# Patient Record
Sex: MALE | Race: BLACK OR AFRICAN AMERICAN | NOT HISPANIC OR LATINO | URBAN - METROPOLITAN AREA
[De-identification: names, ages, dates, MRNs, and addresses within clinical notes are randomized per-mention and may not be internally consistent; named-entity substitution may affect disease eponyms.]

---

## 2018-08-06 ENCOUNTER — EMERGENCY (EMERGENCY)
Facility: HOSPITAL | Age: 23
LOS: 1 days | Discharge: HOME | End: 2018-08-06
Attending: EMERGENCY MEDICINE
Payer: COMMERCIAL

## 2018-08-06 VITALS
DIASTOLIC BLOOD PRESSURE: 70 MMHG | RESPIRATION RATE: 18 BRPM | SYSTOLIC BLOOD PRESSURE: 130 MMHG | HEART RATE: 64 BPM | OXYGEN SATURATION: 100 % | TEMPERATURE: 98 F

## 2018-08-06 VITALS
DIASTOLIC BLOOD PRESSURE: 75 MMHG | WEIGHT: 199.96 LBS | OXYGEN SATURATION: 100 % | HEART RATE: 68 BPM | RESPIRATION RATE: 16 BRPM | TEMPERATURE: 98 F | SYSTOLIC BLOOD PRESSURE: 132 MMHG | HEIGHT: 76 IN

## 2018-08-06 PROCEDURE — 99283 EMERGENCY DEPT VISIT LOW MDM: CPT | Mod: 25

## 2018-08-06 PROCEDURE — 73130 X-RAY EXAM OF HAND: CPT

## 2018-08-06 PROCEDURE — 73130 X-RAY EXAM OF HAND: CPT | Mod: 26,50

## 2018-08-06 PROCEDURE — 12041 INTMD RPR N-HF/GENIT 2.5CM/<: CPT

## 2018-08-06 RX ORDER — IBUPROFEN 200 MG
800 TABLET ORAL ONCE
Qty: 0 | Refills: 0 | Status: COMPLETED | OUTPATIENT
Start: 2018-08-06 | End: 2018-08-06

## 2018-08-06 RX ORDER — CEPHALEXIN 500 MG
500 CAPSULE ORAL ONCE
Qty: 0 | Refills: 0 | Status: COMPLETED | OUTPATIENT
Start: 2018-08-06 | End: 2018-08-06

## 2018-08-06 RX ORDER — POVIDONE-IODINE 5 %
1 AEROSOL (ML) TOPICAL ONCE
Qty: 0 | Refills: 0 | Status: DISCONTINUED | OUTPATIENT
Start: 2018-08-06 | End: 2018-08-10

## 2018-08-06 RX ORDER — CEPHALEXIN 500 MG
1 CAPSULE ORAL
Qty: 14 | Refills: 0 | OUTPATIENT
Start: 2018-08-06 | End: 2018-08-12

## 2018-08-06 RX ADMIN — Medication 500 MILLIGRAM(S): at 07:55

## 2018-08-06 RX ADMIN — Medication 800 MILLIGRAM(S): at 06:33

## 2018-08-06 NOTE — ED PROVIDER NOTE - SKIN WOUND DESCRIPTION
multiple cuts over dorsal aspect of bilateral hands with mild bleeding, irregular laceration overlying R 5th MCP joint/BLEEDING

## 2018-08-06 NOTE — ED PROVIDER NOTE - OBJECTIVE STATEMENT
24yo M presents with bilateral hand injuries. reports that at 3am he a cabinet with plates and glasses fell onto bilateral hand creating multiple cuts. Denies hand weakness or numbness. Reports bleeding. last Tdap 1 yr ago.

## 2018-08-06 NOTE — ED PROVIDER NOTE - MEDICAL DECISION MAKING DETAILS
22yo M presents with bilateral hand lacerations. Will obtain XRs. Given ibuprofen. Tdap UTD. Will repair laceration.

## 2018-08-06 NOTE — ED PROVIDER NOTE - PROGRESS NOTE DETAILS
Xrs show no acute fractures  wound cleansed with idodine/sterile water, multiple foreign bodies removed from wounds  R hand irregular laceration evaluated-consulted Dr. Cardoso plastics/hand surgery for repair Dr. Owusu recommends sending patient to his office upon discharge from ED for hand laceration repair.  Left hand small lacerations repaired in ED

## 2018-08-06 NOTE — ED PROVIDER NOTE - CARE PLAN
Principal Discharge DX:	Laceration of hand with foreign body, unspecified laterality, initial encounter

## 2021-12-22 NOTE — ED ADULT NURSE NOTE - NSIMPLEMENTINTERV_GEN_ALL_ED
with patient Implemented All Universal Safety Interventions:  [place] to call system. Call bell, personal items and telephone within reach. Instruct patient to call for assistance. Room bathroom lighting operational. Non-slip footwear when patient is off stretcher. Physically safe environment: no spills, clutter or unnecessary equipment. Stretcher in lowest position, wheels locked, appropriate side rails in place.

## 2025-02-08 NOTE — ED PROVIDER NOTE - CROS ED CONS ALL NEG
[facility]   Department of Infectious Disease  Consult Note        PATIENT NAME: Irvin Diaz Jr.  MRN: [id number]  TODAY'S DATE: 02/07/2025  ADMIT DATE: 2/6/2025  LOS: 0 days    CHIEF COMPLAINT: Called to Return (Pt states he got a call to return to ed. /)      PRINCIPLE PROBLEM: Bacteremia due to Staphylococcus    REASON FOR CONSULT:     ASSESSMENT and PLAN   High-grade MSSA bacteremia in a patient with recurrent MSSA bacteriuria.  Follow repeat blood cultures.  Check TTE, CRP and ESR.  DC ceftriaxone and start oxacillin 2 g q.4 hours.    2. Recurrent MSSA bacteriuria with hematuria.  Need to rule out prostate abscess.  Noncontrast CT abdomen and pelvis with no prostate abnormality.  Check CT abdomen and pelvis with IV contrast.    3. Left ankle and foot cellulitis.  Most likely related to MSSA.  Gout maybe contributed.  Noncontrast CT without abscess.  MRI shows tenosynovitis but no abscess or osteomyelitis.  Antibiotics as above.  Gout management as per hospitalist.    4. Compensated alcoholic cirrhosis.  Immune to HAV and HBV.  HCV screen negative on multiple locations.  Management as per hospitalist.      5. Microcytic anemia.  Most likely related to cirrhosis.       RECOMMENDATIONS:   Check CRP and ESR   Check CT of the pelvis with IV contrast   Obtain TTE  DC ceftriaxone   Start Oxacillin 2 g Q 4 hours    Thank you for this consult. Please send Epic secure chat with any questions.    Antibiotics (From admission, onward)      Start     Stop Route Frequency Ordered    02/08/25 0900  cefTRIAXone injection 2 g         -- IV Daily 02/07/25 0828    02/07/25 0930  mupirocin 2 % ointment         02/12/25 0859 Nasl 2 times daily 02/07/25 0825          Antifungals (From admission, onward)      None           Antivirals (From admission, onward)      None            HPI      Irvin Diaz Jr. is a 52 y.o. male with history of alcoholic cirrhosis with portal hypertension, hypertension, GERD.  In an  episode of hematuria in October 20, 2024.  Urine culture that time apparently grew MSSA.  Treated with antibiotics with resolution.  Appears he was lost to follow-up to Urology Service.    Started feeling unwell about 2 weeks ago around the snowstorm time.  Then started having hematuria again and later developed left foot swelling and pain.  Seen by his PCP on 01/29/2025.  He was diagnosed with cellulitis left worse than prescribed Keflex.  Presents to the emergency room with a nice day 1/30/25 for evaluation.  Uric acid level was high.  X-ray foot is unremarkable SR 4 screws in the medial malleolus area.  He was given steroid and discharged on allopurinol.  Staphylococcus aureus.  Back in the ER again on 02/04/2025 with left foot pain.  Stabilized and discharged once again.    Hematuria persisted. Left foot swelling also positive but did improve.  So Urology CMP 02/06/2025.  Called back to the ER because blood culture 02/04/2025 grew.  Noncontrast CT abdomen and pelvis shows cirrhosis but no other acute findings.  Noncontrast CT left ankle and foot shows cellulitis and no abscess.  MRI left ankle and foot documented tenosynovitis but no abscess or osteomyelitis.  ID asked to assist with his care.      Antibiotic history:    Keflex:.  01/29/2025-02/06/2025  Ceftriaxone: 2/6/25-    Microbiology:    Urine culture 03/27/2024, 10/22/2024: MSSA   Urine culture 01/29/2025, 02/04/2025: MSSA   Blood cultures on 04/25: MSSA 2/2   Urine culture 02/07/2025: In progress   Blood culture 02/07/2025: In progress    Social History  Marital Status:   Alcohol History:  reports that he does not currently use alcohol.  Tobacco History:  reports that he quit smoking about 25 years ago. His smoking use included cigarettes. He has never used smokeless tobacco.  Drug History:  reports that he does not currently use drugs.      Review of patient's allergies indicates:   Allergen Reactions    Ciprofloxacin hcl Hallucinations     Meperidine Hives     Pt medicated w/multiple medications (demerol, protonix, and zofran)  w/i 10 mins time frame prior to developing localized hives near IV site that med was administered. Pt reports he has/takes all other medications on daily basis.     Morphine Itching    Nsaids (non-steroidal anti-inflammatory drug)      Kidney Disease    Tylenol [acetaminophen]      Hx of liver disease     Past Medical History:   Diagnosis Date    Alcohol withdrawal 5/1/2022    Alcoholic cirrhosis of liver     Alcoholic ketoacidosis 6/6/2021    Arthritis     ATN (acute tubular necrosis)     CHF (congestive heart failure)     Diverticulitis 01/2020    Esophageal varices     GERD (gastroesophageal reflux disease)     GI bleed     Hip arthritis     Left    Hypertension     Liver cirrhosis     Macrocytic anemia     Pulmonary embolism 08/2018    Unprovoked DVT.  Stop Coumadin due to GIB    Thrombocytopenia      Past Surgical History:   Procedure Laterality Date    ANKLE SURGERY      BLOCK, NERVE, PERIPHERAL Left 06/24/2022    Procedure: Left Hip femoral-obturator accessory nerve block;  Surgeon: Robbin De La Garza DO;  Location: [facility];  Service: Pain Management;  Laterality: Left;    COLON SURGERY  2007    COLONOSCOPY  09/05/2019    [facility]    COLONOSCOPY N/A 02/17/2021    Procedure: COLONOSCOPY;  Surgeon: Renea Billingsley MD;  Location: [facility];  Service: Endoscopy;  Laterality: N/A;    COLONOSCOPY N/A 2/13/2023    Procedure: COLONOSCOPY;  Surgeon: Rudy Orellana MD;  Location: [facility] ([address] [place] [address]);  Service: Endoscopy;  Laterality: N/A;  cirrhosis-labs done on 1/11/23  instr portal-GT  No answer for precall- [place]    COLONOSCOPY N/A 3/10/2023    Procedure: COLONOSCOPY;  Surgeon: Rudy Orellana MD;  [facility]  Service: Endoscopy;  Laterality: N/A;  inst via poral per pt request    COLONOSCOPY W/ BIOPSIES  02/17/2021    ESOPHAGOGASTRODUODENOSCOPY N/A 07/26/2019    Procedure: EGD (ESOPHAGOGASTRODUODENOSCOPY);  Surgeon:  Brian Trivedi MD;  Location: [facility];  Service: Endoscopy;  Laterality: N/A;    ESOPHAGOGASTRODUODENOSCOPY N/A 04/08/2020    Procedure: EGD (ESOPHAGOGASTRODUODENOSCOPY);  Surgeon: Donn Acuna MD;  Location: [facility];  Service: Endoscopy;  Laterality: N/A;    ESOPHAGOGASTRODUODENOSCOPY N/A 01/03/2021    Procedure: EGD (ESOPHAGOGASTRODUODENOSCOPY)- coffee ground emesis, hx varices;  Surgeon: Steve Chairez MD;  Location: [facility];  Service: Endoscopy;  Laterality: N/A;    ESOPHAGOGASTRODUODENOSCOPY N/A 05/03/2021    Procedure: EGD (ESOPHAGOGASTRODUODENOSCOPY);  Surgeon: Jeanmarie Ngo MD;  Location: [facility];  Service: Endoscopy;  Laterality: N/A;    ESOPHAGOGASTRODUODENOSCOPY N/A 07/19/2021    Procedure: ESOPHAGOGASTRODUODENOSCOPY (EGD);  Surgeon: Claudio Medeiros MD;  Location: [facility];  Service: Endoscopy;  Laterality: N/A;    ESOPHAGOGASTRODUODENOSCOPY  12/20/2021    ESOPHAGOGASTRODUODENOSCOPY N/A 12/20/2021    Procedure: EGD (ESOPHAGOGASTRODUODENOSCOPY);  Surgeon: Ajay Jackson MD;  Location: [facility];  Service: Endoscopy;  Laterality: N/A;    ESOPHAGOGASTRODUODENOSCOPY N/A 05/03/2022    Procedure: EGD (ESOPHAGOGASTRODUODENOSCOPY);  Surgeon: Brian Trivedi MD;  Location: [facility];  Service: Endoscopy;  Laterality: N/A;    ESOPHAGOGASTRODUODENOSCOPY N/A 7/7/2022    Procedure: EGD (ESOPHAGOGASTRODUODENOSCOPY);  Surgeon: Ajay Jackson MD;  Location: [facility];  Service: Endoscopy;  Laterality: N/A;    ESOPHAGOGASTRODUODENOSCOPY N/A 11/29/2022    Procedure: EGD (ESOPHAGOGASTRODUODENOSCOPY);  Surgeon: Edouard Maynard MD;  Location: [address]);  Service: Endoscopy;  Laterality: N/A;    ESOPHAGOGASTRODUODENOSCOPY N/A 4/28/2023    Procedure: EGD (ESOPHAGOGASTRODUODENOSCOPY);  Surgeon: Caesar Dickens MD;  Location: [facility];  Service: Endoscopy;  Laterality: N/A;    ESOPHAGOGASTRODUODENOSCOPY N/A 3/28/2024    Procedure: EGD (ESOPHAGOGASTRODUODENOSCOPY);  Surgeon:  Jeanmarie Ngo MD;  Location: [facility];  Service: Endoscopy;  Laterality: N/A;    HERNIA REPAIR Left     Inguinal    INJECTION OF JOINT Right 9/28/2023    Procedure: RT Hip Injection;  Surgeon: Robbin De La Garza DO;  Location: [facility];  Service: Pain Management;  Laterality: Right;  oral - 20 mins    INJECTION OF JOINT Right 10/8/2024    Procedure: Right hip injection;  Surgeon: Robbin De La Garza DO;  Location: [facility];  Service: Pain Management;  Laterality: Right;  No sed no AC    UPPER GASTROINTESTINAL ENDOSCOPY N/A 07/07/2022     Family History   Problem Relation Name Age of Onset    Hypertension Mother      Breast cancer Mother      Hypertension Father      Prostate cancer Father      Bladder Cancer Father         SUBJECTIVE     Review of systems see HPI.  Ten systems reviewed unremarkable     OBJECTIVE   Temp:  [98.3 °F (36.8 °C)-98.7 °F (37.1 °C)] 98.3 °F (36.8 °C)  Pulse:  [69-85] 85  Resp:  [16-18] 17  SpO2:  [96 %-100 %] 100 %  BP: (106-181)/(53-83) 162/83  Temp:  [98.3 °F (36.8 °C)-98.7 °F (37.1 °C)]   Temp: 98.3 °F (36.8 °C) (02/07/25 1645)  Pulse: 85 (02/07/25 1645)  Resp: 17 (02/07/25 1645)  BP: (!) 162/83 (02/07/25 1645)  SpO2: 100 % (02/07/25 1645)    Intake/Output Summary (Last 24 hours) at 2/7/2025 1819  Last data filed at 2/7/2025 1727  Gross per 24 hour   Intake 220 ml   Output --   Net 220 ml       Physical Exam  General:  Humalog quietly in bed in no acute distress   Left lower history of ventricular edema of leg ankle and foot with erythema ankle and foot.  Normal range of motion of ankle and toes.  Evidence of ankle surgery   CVS: S1-S2 heard, no murmurs appreciated   Respiratory: Clear to auscultation   Abdomen: Full, soft, nontender, no palpable organomegaly   Skin: No rash appreciated   CNS: No focal deficits   Musculoskeletal: No other joint or bony resorption septal described on the left lower extremity  Psych: Good mood, normal affect.     VAD:   "PIV  ISOLATION:  None    Wounds:  None    Significant Labs: All pertinent labs within the past 24 hours have been reviewed.    CBC LAST 7  Recent Labs   Lab 02/04/25  1030 02/07/25  0402   WBC 9.96 9.64   RBC 3.06* 3.16*   HGB 10.4* 10.6*   HCT 31.5* 32.3*   * 102*   MCH 34.0* 33.5*   MCHC 33.0 32.8   RDW 16.9* 16.5*   PLT 75* 140*   MPV 12.2 9.8   GRAN 70.3  7.0 66.5  6.4   LYMPH 17.2*  1.7 19.7  1.9   MONO 10.3  1.0 11.7  1.1*   BASO 0.03 0.04   NRBC 0 0       CHEMISTRY LAST 7  Recent Labs   Lab 02/04/25  1138 02/07/25  0402   * 133*   K 3.9 3.8    103   CO2 26 23   ANIONGAP 2* 7*   BUN 25* 23*   CREATININE 1.8* 2.0*    96   CALCIUM 8.2* 9.1   MG 2.3  --    ALBUMIN 2.7* 3.1*   PROT 7.0 8.7*   ALKPHOS 93 109   ALT 30 29   AST 41* 39   BILITOT 3.6* 4.7*       Estimated Creatinine Clearance: 56.4 mL/min (A) (based on SCr of 2 mg/dL (H)).    INFLAMMATORY/PROCAL  LAST 7  Recent Labs   Lab 02/04/25  1419 02/07/25  0402   PROCAL 0.267 0.760*     No results found for: "ESR"  CRP   Date Value Ref Range Status   10/27/2020 0.60 0.00 - 1.00 mg/dL Final       PRIOR  MICROBIOLOGY:  Reviewed    Susceptibility data from last 90 days.  Collected Specimen Info Organism Ceftriaxone Clindamycin Erythromycin Oxacillin Penicillin Tetracycline Trimeth/Sulfa Vancomycin   02/04/25 Urine Staphylococcus aureus  S    S  R  S  S  S   02/04/25 Blood from Peripheral, Hand, Left Staphylococcus aureus  S  R  R  S  R  S  S  S   02/04/25 Blood from Peripheral, Hand, Right Staphylococcus aureus           01/29/25 Urine Staphylococcus aureus     S    S        LAST 7 DAYS MICROBIOLOGY   Microbiology Results (last 7 days)       Procedure Component Value Units Date/Time    Blood culture x two cultures. Draw prior to antibiotics. [[id number]] Collected: 02/07/25 0233    Order Status: Completed Specimen: Blood Updated: 02/07/25 0917     Blood Culture, Routine No Growth to date    Narrative:      Aerobic and " anaerobic  Collection has been rescheduled by 2MB at 02/06/2025 23:30 Reason:   Patient unavailable  Collection has been rescheduled by 2MB at 02/06/2025 23:30 Reason:   Patient unavailable    Blood culture x two cultures. Draw prior to antibiotics. [[id number]] Collected: 02/07/25 0234    Order Status: Completed Specimen: Blood Updated: 02/07/25 0917     Blood Culture, Routine No Growth to date    Narrative:      Aerobic and anaerobic  Collection has been rescheduled by 2MB at 02/06/2025 23:30 Reason:   Patient unavailable  Collection has been rescheduled by 2MB at 02/06/2025 23:30 Reason:   Patient unavailable    Urine culture [[id number]] Collected: 02/07/25 0041    Order Status: No result Specimen: Urine Updated: 02/07/25 0125          CURRENT/PREVIOUS VISIT EKG  Results for orders placed or performed during the hospital encounter of 02/06/25   EKG 12-lead    Collection Time: 02/07/25 12:42 AM   Result Value Ref Range    QRS Duration 106 ms    [facility] QTC Calculation 479 ms    Narrative    Test Reason : Z13.6,    Vent. Rate :  70 BPM     Atrial Rate :  70 BPM     P-R Int : 148 ms          QRS Dur : 106 ms      QT Int : 444 ms       P-R-T Axes :  57  69  16 degrees    QTcB Int : 479 ms    Normal sinus rhythm  Possible Left atrial enlargement  Minimal voltage criteria for LVH, may be normal variant ( Sokolow-Coleman )  Borderline Abnormal ECG  No previous ECGs available    Referred By: AAAREFERRAL SELF           Confirmed By:      Significant Imaging: I have reviewed all relevant and available imaging results/findings within the past 24 hours.    I spent a total of 65 minutes on the day of the visit.This includes face to face time and non-face to face time preparing to see the patient (eg, review of tests), obtaining and/or reviewing separately obtained history, documenting clinical information in the electronic or other health record, independently interpreting results and communicating results to the  patient/family/caregiver, or care coordinator.    Telly Knott MD  Date of Service: 02/07/2025      This note was created using [employer] voice recognition software that occasionally misinterpreted phrases or words.   negative...